# Patient Record
Sex: MALE | Race: WHITE | NOT HISPANIC OR LATINO | Employment: OTHER | URBAN - METROPOLITAN AREA
[De-identification: names, ages, dates, MRNs, and addresses within clinical notes are randomized per-mention and may not be internally consistent; named-entity substitution may affect disease eponyms.]

---

## 2020-09-24 ENCOUNTER — TELEPHONE (OUTPATIENT)
Dept: CARDIAC REHAB | Facility: HOSPITAL | Age: 65
End: 2020-09-24

## 2020-09-24 NOTE — TELEPHONE ENCOUNTER
Received referral for CHF from physician. Called to see if patient interested. Still afraid to get much with COVID virus. Not at this time. Informed we will hold onto information if want to contact us in the future.

## 2021-02-06 ENCOUNTER — INPATIENT HOSPITAL (OUTPATIENT)
Dept: URBAN - METROPOLITAN AREA HOSPITAL 107 | Facility: HOSPITAL | Age: 66
End: 2021-02-06

## 2021-02-06 DIAGNOSIS — K56.699 OTHER INTESTINAL OBSTRUCTION UNSPECIFIED AS TO PARTIAL VERSU: ICD-10-CM

## 2021-02-06 PROCEDURE — 99223 1ST HOSP IP/OBS HIGH 75: CPT | Performed by: INTERNAL MEDICINE

## 2021-02-08 ENCOUNTER — INPATIENT HOSPITAL (OUTPATIENT)
Dept: URBAN - METROPOLITAN AREA HOSPITAL 107 | Facility: HOSPITAL | Age: 66
End: 2021-02-08

## 2021-02-08 DIAGNOSIS — K56.7 ILEUS, UNSPECIFIED: ICD-10-CM

## 2021-02-08 PROCEDURE — 99232 SBSQ HOSP IP/OBS MODERATE 35: CPT | Performed by: PHYSICIAN ASSISTANT

## 2021-02-09 ENCOUNTER — INPATIENT HOSPITAL (OUTPATIENT)
Dept: URBAN - METROPOLITAN AREA HOSPITAL 107 | Facility: HOSPITAL | Age: 66
End: 2021-02-09

## 2021-02-09 DIAGNOSIS — K76.9 LIVER DISEASE, UNSPECIFIED: ICD-10-CM

## 2021-02-09 DIAGNOSIS — K56.7 ILEUS, UNSPECIFIED: ICD-10-CM

## 2021-02-09 PROCEDURE — 99232 SBSQ HOSP IP/OBS MODERATE 35: CPT | Performed by: PHYSICIAN ASSISTANT

## 2021-02-10 ENCOUNTER — INPATIENT HOSPITAL (OUTPATIENT)
Dept: URBAN - METROPOLITAN AREA HOSPITAL 107 | Facility: HOSPITAL | Age: 66
End: 2021-02-10

## 2021-02-10 DIAGNOSIS — K56.7 ILEUS, UNSPECIFIED: ICD-10-CM

## 2021-02-10 PROCEDURE — 99232 SBSQ HOSP IP/OBS MODERATE 35: CPT | Performed by: PHYSICIAN ASSISTANT

## 2021-02-11 ENCOUNTER — INPATIENT HOSPITAL (OUTPATIENT)
Dept: URBAN - METROPOLITAN AREA HOSPITAL 107 | Facility: HOSPITAL | Age: 66
End: 2021-02-11

## 2021-02-11 DIAGNOSIS — R74.8 ABNORMAL LEVELS OF OTHER SERUM ENZYMES: ICD-10-CM

## 2021-02-11 DIAGNOSIS — K56.699 OTHER INTESTINAL OBSTRUCTION UNSPECIFIED AS TO PARTIAL VERSU: ICD-10-CM

## 2021-02-11 PROCEDURE — 99232 SBSQ HOSP IP/OBS MODERATE 35: CPT | Performed by: PHYSICIAN ASSISTANT

## 2021-08-16 ENCOUNTER — TRANSCRIBE ORDERS (OUTPATIENT)
Dept: ADMINISTRATIVE | Facility: HOSPITAL | Age: 66
End: 2021-08-16

## 2021-08-16 ENCOUNTER — LAB (OUTPATIENT)
Dept: LAB | Facility: HOSPITAL | Age: 66
End: 2021-08-16

## 2021-08-16 DIAGNOSIS — E03.9 HYPOTHYROIDISM, ADULT: ICD-10-CM

## 2021-08-16 DIAGNOSIS — E03.9 HYPOTHYROIDISM, ADULT: Primary | ICD-10-CM

## 2021-08-16 LAB — TSH SERPL DL<=0.05 MIU/L-ACNC: 3.4 UIU/ML (ref 0.27–4.2)

## 2021-08-16 PROCEDURE — 84443 ASSAY THYROID STIM HORMONE: CPT

## 2021-08-16 PROCEDURE — 36415 COLL VENOUS BLD VENIPUNCTURE: CPT

## 2023-10-30 PROBLEM — H90.3 BILATERAL SENSORINEURAL HEARING LOSS: Status: ACTIVE | Noted: 2023-07-18

## 2023-10-30 PROBLEM — I42.8 NICM (NONISCHEMIC CARDIOMYOPATHY): Status: ACTIVE | Noted: 2020-12-06

## 2023-10-30 PROBLEM — I47.29 NSVT (NONSUSTAINED VENTRICULAR TACHYCARDIA): Status: ACTIVE | Noted: 2021-02-02

## 2023-10-30 PROBLEM — I48.19 PERSISTENT ATRIAL FIBRILLATION: Status: ACTIVE | Noted: 2021-02-09

## 2023-10-30 PROBLEM — E11.9 DIABETES MELLITUS WITHOUT COMPLICATION: Status: ACTIVE | Noted: 2021-08-10

## 2023-10-30 PROBLEM — E78.5 HYPERLIPIDEMIA: Status: ACTIVE | Noted: 2023-10-30

## 2023-10-30 PROBLEM — E03.9 HYPOTHYROIDISM: Status: ACTIVE | Noted: 2023-10-30

## 2023-10-30 PROBLEM — M51.26 DISPLACEMENT OF LUMBAR INTERVERTEBRAL DISC WITHOUT MYELOPATHY: Status: ACTIVE | Noted: 2023-10-30

## 2023-10-30 PROBLEM — Z95.811 LVAD (LEFT VENTRICULAR ASSIST DEVICE) PRESENT: Status: ACTIVE | Noted: 2021-01-29

## 2023-10-30 PROBLEM — I10 HTN (HYPERTENSION): Status: ACTIVE | Noted: 2022-10-06

## 2023-10-30 PROBLEM — Z46.1 ENCOUNTER FOR FITTING AND ADJUSTMENT OF HEARING AID: Status: ACTIVE | Noted: 2023-07-18

## 2023-10-30 PROBLEM — G47.30 SLEEP APNEA: Status: ACTIVE | Noted: 2023-10-30

## 2023-10-30 PROBLEM — I50.23 ACUTE ON CHRONIC SYSTOLIC (CONGESTIVE) HEART FAILURE: Status: ACTIVE | Noted: 2021-01-15

## 2023-10-30 PROBLEM — I34.0 SEVERE MITRAL REGURGITATION: Status: ACTIVE | Noted: 2020-12-07

## 2023-10-30 PROBLEM — T82.7XXA: Status: ACTIVE | Noted: 2023-08-31

## 2023-10-30 PROBLEM — I42.9 CARDIOMYOPATHY: Status: ACTIVE | Noted: 2023-10-30

## 2023-10-30 PROBLEM — E66.9 OBESITY: Status: ACTIVE | Noted: 2022-10-06

## 2023-10-30 PROBLEM — Z23 NEED FOR PROPHYLACTIC VACCINATION AND INOCULATION AGAINST INFLUENZA: Status: ACTIVE | Noted: 2023-10-30

## 2023-10-30 PROBLEM — D49.7 PITUITARY TUMOR: Status: ACTIVE | Noted: 2021-09-17

## 2024-12-06 ENCOUNTER — TRANSCRIBE ORDERS (OUTPATIENT)
Dept: PHYSICAL THERAPY | Facility: CLINIC | Age: 69
End: 2024-12-06
Payer: MEDICARE

## 2024-12-06 DIAGNOSIS — M51.360 DEGENERATION OF INTERVERTEBRAL DISC OF LUMBAR REGION WITH DISCOGENIC BACK PAIN: Primary | ICD-10-CM

## 2024-12-20 ENCOUNTER — TREATMENT (OUTPATIENT)
Dept: PHYSICAL THERAPY | Facility: CLINIC | Age: 69
End: 2024-12-20
Payer: MEDICARE

## 2024-12-20 DIAGNOSIS — M54.50 CHRONIC BILATERAL LOW BACK PAIN, UNSPECIFIED WHETHER SCIATICA PRESENT: Primary | ICD-10-CM

## 2024-12-20 DIAGNOSIS — G89.29 CHRONIC BILATERAL LOW BACK PAIN, UNSPECIFIED WHETHER SCIATICA PRESENT: Primary | ICD-10-CM

## 2024-12-20 NOTE — PROGRESS NOTES
Physical Therapy Initial Evaluation and Plan of Care    Patient: Herb Nobles   : 1955  Diagnosis/ICD-10 Code:  Chronic bilateral low back pain, unspecified whether sciatica present [M54.50, G89.29]  Referring practitioner: Osmani Adler MD  Date of Initial Visit: 2024  Today's Date: 2024  Patient seen for 1 sessions           Subjective Questionnaire: Oswestry: 68%      Subjective Evaluation    History of Present Illness  Mechanism of injury: Patient presents to physical therapy with cc of chronic lower back pain.  Patient reports history of lumbar fusion, with the last surgery being in 2018.  Patient currently using lumbar support brace when active.  Reports that he has difficulty tolerating standing and walking for more than 10 minutes at this time.  Reports pain at midline of lower back, denies radiating pain into BLE's.  Patient is an LVAD patient and is limited on aerobic activities due to lack of endurance.  Patient reports that he uses heat and takes hydrocodone for pain mangement currently.  Patient wishes to tolerate standing and walking activities with less lower back pain so that he may tolerate household chores and working in garage with better tolerance.        Patient Occupation: retired Pain  Current pain ratin  At worst pain ratin  Location: bilateral lower back  Quality: discomfort, pressure, sharp and knife-like  Relieving factors: rest, heat and medications  Aggravating factors: standing, lifting, ambulation, squatting, movement and prolonged positioning (weight bearing intolerance)  Progression: worsening    Social Support  Lives in: multiple-level home  Lives with: spouse    Patient Goals  Patient goals for therapy: decreased edema, decreased pain, increased motion, increased strength and independence with ADLs/IADLs           Objective          Palpation   Left   Hypertonic in the lumbar paraspinals and quadratus lumborum.   Tenderness of the lumbar  paraspinals and quadratus lumborum.     Right   Hypertonic in the lumbar paraspinals and quadratus lumborum. Tenderness of the lumbar paraspinals and quadratus lumborum.     Tenderness     Left Hip   Tenderness in the PSIS.     Right Hip   Tenderness in the PSIS.     Active Range of Motion     Additional Active Range of Motion Details  Flexion 50% limited with pain  Right sidebending 75% limited with pain  Left sidebending 60% limited with pain  Extension 90% limited with pain    Passive Range of Motion   Left Hip   External rotation (90/90): WFL  Internal rotation (90/90): 3 degrees with pain    Right Hip   External rotation (90/90): WFL  Internal rotation (90/90): 5 degrees with pain    Strength/Myotome Testing     Left Hip   Planes of Motion   Extension: 4-  Abduction: 4-    Right Hip   Planes of Motion   Extension: 4-  Abduction: 4-    Left Knee   Flexion: 4-  Extension: 4    Right Knee   Flexion: 4-  Extension: 4    Tests     Lumbar     Left   Positive quadrant.     Right   Positive quadrant.     Ambulation     Observational Gait   Gait: antalgic   Decreased walking speed, stride length, left step length and right step length.           Assessment & Plan       Assessment  Impairments: abnormal gait, abnormal muscle tone, abnormal or restricted ROM, activity intolerance, impaired physical strength, lacks appropriate home exercise program, pain with function and weight-bearing intolerance   Functional limitations: carrying objects, lifting, walking, pulling, pushing, uncomfortable because of pain, standing and stooping   Assessment details: Patient presents to physical therapy with s/s congruent with MD diagnosis of lower back pain.  Patient demonstrates limited AROM in lumbar spine, weakness in BLE's and abnormal gait mechanics.  Patient is appropriate for PT intervention in order to address these deficits so that he may tolerate walking and ADL's with less pain/limitation.   Prognosis: fair    Goals  Plan  Goals: In three weeks, patient will report at least 25% reduction in pain level.    In three weeks, patient will demonstrate at least 25% improvement in AROM in lumbar spine in order to demonstrate improved ability to bend down and put on pants with less limitation.     In six weeks, patient will demonstrate proper technique with abdominal stabilization activities.  In six weeks, patient will demonstrate lumbar AROM at least 50% improved without pain level over 2/10 in order to demonstrate ability to tolerate working in garage, standing, for at least 30 minutes before having to rest.  In six weeks, patient will demonstrate decreased perceived disability by decreasing score on Oswestry by at least 12%.        Plan  Therapy options: will be seen for skilled therapy services  Planned modality interventions: cryotherapy and thermotherapy (hydrocollator packs)  Planned therapy interventions: manual therapy, neuromuscular re-education, soft tissue mobilization, postural training, strengthening, stretching, therapeutic activities, home exercise program, joint mobilization, functional ROM exercises, flexibility, abdominal trunk stabilization and balance/weight-bearing training  Frequency: 2x week  Duration in weeks: 6  Treatment plan discussed with: patient        Manual Therapy:    8     mins  59884;  Therapeutic Exercise:    15     mins  16011;     Neuromuscular Kin:    15    mins  68823;    Therapeutic Activity:          mins  24770;     Gait Training:           mins  10929;     Ultrasound:          mins  31664;    Electrical Stimulation:         mins  95257 ( );  Dry Needling          mins self-pay    Timed Treatment:   38   mins   Total Treatment:     55   mins    PT SIGNATURE: Juan Jaramillo PT   DATE TREATMENT INITIATED: 12/20/2024    Initial Certification  Certification Period: 3/20/2025  I certify that the therapy services are furnished while this patient is under my care.  The services outlined above are  required by this patient, and will be reviewed every 90 days.     PHYSICIAN: Osmani Adler MD      DATE:     Please sign and return via fax to [phone].. Thank you, [facility].

## 2025-01-09 ENCOUNTER — TREATMENT (OUTPATIENT)
Dept: PHYSICAL THERAPY | Facility: CLINIC | Age: 70
End: 2025-01-09
Payer: MEDICARE

## 2025-01-09 DIAGNOSIS — M54.50 CHRONIC BILATERAL LOW BACK PAIN, UNSPECIFIED WHETHER SCIATICA PRESENT: Primary | ICD-10-CM

## 2025-01-09 DIAGNOSIS — G89.29 CHRONIC BILATERAL LOW BACK PAIN, UNSPECIFIED WHETHER SCIATICA PRESENT: Primary | ICD-10-CM

## 2025-01-09 NOTE — PROGRESS NOTES
Physical Therapy Daily Progress Note      Patient: Herb Nobles   : 1955  Diagnosis/ICD-10 Code:  Chronic bilateral low back pain, unspecified whether sciatica present [M54.50, G89.29]  Referring practitioner: Osmani Adler MD  Date of Initial Visit: Type: THERAPY  Noted: 2024  Today's Date: 2025  Patient seen for 2 sessions             Subjective No significant changes to report.  Still having the same c/o low back pain.    Objective   See Exercise, Manual, and Modality Logs for complete treatment.       Assessment/Plan  Responded well with MH.  Manual techniques tolerated fairly well.  Exercises were tolerated fairly well while being performed.  However, by the time he was finished and sat up, he stated he could tell that he will feel that (exercising) later tonight.    Progress per Plan of Care           Timed:         Manual Therapy:    8     mins  12301;     Therapeutic Exercise:    20     mins  04954;     Neuromuscular Kin:    15    mins  02068;        Timed Treatment:   43   mins   Total Treatment:     53   mins        Miguelito Hercules PTA  Physical Therapist Assistant

## 2025-04-16 ENCOUNTER — TRANSCRIBE ORDERS (OUTPATIENT)
Dept: PHYSICAL THERAPY | Facility: CLINIC | Age: 70
End: 2025-04-16
Payer: MEDICARE

## 2025-04-16 DIAGNOSIS — Z98.1 HISTORY OF LUMBAR FUSION: ICD-10-CM

## 2025-04-16 DIAGNOSIS — M51.26 DISPLACEMENT OF LUMBAR INTERVERTEBRAL DISC WITHOUT MYELOPATHY: ICD-10-CM

## 2025-04-16 DIAGNOSIS — M51.360 DEGENERATION OF INTERVERTEBRAL DISC OF LUMBAR REGION WITH DISCOGENIC BACK PAIN: Primary | ICD-10-CM

## 2025-04-30 ENCOUNTER — TREATMENT (OUTPATIENT)
Dept: PHYSICAL THERAPY | Facility: CLINIC | Age: 70
End: 2025-04-30
Payer: MEDICARE

## 2025-04-30 DIAGNOSIS — M54.50 CHRONIC MIDLINE LOW BACK PAIN, UNSPECIFIED WHETHER SCIATICA PRESENT: Primary | ICD-10-CM

## 2025-04-30 DIAGNOSIS — G89.29 CHRONIC MIDLINE LOW BACK PAIN, UNSPECIFIED WHETHER SCIATICA PRESENT: Primary | ICD-10-CM

## 2025-04-30 NOTE — PROGRESS NOTES
Physical Therapy Initial Evaluation and Plan of Care    Patient: Herb Nobles   : 1955  Diagnosis/ICD-10 Code:  Chronic midline low back pain, unspecified whether sciatica present [M54.50, G89.29]  Referring practitioner: Osmani Adler MD  Date of Initial Visit: 2025  Today's Date: 2025  Patient seen for 1 sessions           Subjective Questionnaire: Oswestry: 76%      Subjective Evaluation    History of Present Illness  Mechanism of injury: Patient presents to physical therapy with cc of chronic lower back pain.  Patient has history of L3-L4 posterior fusion in 2018.  Patient was seen for PT intervention in  for lower back and had some improvement, however over the past few months his lower back pain has gotten worse.  Reports that he is having difficulty tolerating prolonged standing and walking due to the pain in his lower back.  Patient is retired, however wishes to be active with yard work/housework with less pain in lower back.     Pain  Current pain ratin  At worst pain ratin  Location: bilateral posterior hips, lower back  Quality: sharp, knife-like and discomfort  Relieving factors: support, rest and heat  Aggravating factors: stairs, standing, ambulation, squatting, lifting, movement and prolonged positioning  Progression: worsening    Social Support  Lives in: multiple-level home    Diagnostic Tests  MRI studies: abnormal    Patient Goals  Patient goals for therapy: decreased pain, increased strength, independence with ADLs/IADLs and increased motion           Objective          Palpation   Left   Hypertonic in the lumbar paraspinals.   Tenderness of the lumbar paraspinals.     Right   Hypertonic in the lumbar paraspinals. Tenderness of the lumbar paraspinals.     Tenderness     Left Hip   Tenderness in the PSIS.     Right Hip   Tenderness in the PSIS.     Active Range of Motion     Additional Active Range of Motion Details  Flexion 25% limited   R sidebending 50%  limited with lower back pain  Extension 75% limited with lower back pain  L sidebending 25% limited     Passive Range of Motion   Left Hip   External rotation (90/90): 50 degrees   Internal rotation (90/90): 10 degrees     Right Hip   External rotation (90/90): 45 degrees   Internal rotation (90/90): 0 degrees with pain    Strength/Myotome Testing     Left Hip   Planes of Motion   Flexion: 4+  Extension: 4-  Abduction: 4-  External rotation: 5  Internal rotation: 5    Right Hip   Planes of Motion   Flexion: 4+  Extension: 4-  Abduction: 4-  External rotation: 5  Internal rotation: 5    Left Knee   Flexion: 5  Extension: 5    Right Knee   Flexion: 3+  Extension: 5    Left Ankle/Foot   Dorsiflexion: 5    Right Ankle/Foot   Dorsiflexion: 5    Tests     Lumbar     Right   Positive quadrant.     Right Hip   Positive FADIR.     Ambulation     Observational Gait   Gait: antalgic   Decreased walking speed and stride length.           Assessment & Plan       Assessment  Impairments: abnormal gait, abnormal or restricted ROM, activity intolerance, impaired physical strength, lacks appropriate home exercise program and pain with function   Functional limitations: carrying objects, lifting, walking, pulling, pushing, uncomfortable because of pain and standing   Assessment details: Patient presents to physical therapy with s/s congruent with MD diagnosis of lower back pain.  Patient demonstrates AROM limitation in all planes, weakness in BLE's and elevated pain level with functional movements.  Patient is appropriate for PT intervention in order to address these activities so that he may tolerate ADL's and walking with less pain/limitation.   Prognosis: good    Goals  Plan Goals: In two weeks, patient will report at least 25% reduction in pain level.    In two weeks, patient will demonstrate at least 25% improvement in AROM in lumbar spine in order to demonstrate decreased limitation when getting in/out of vehicle.     In four  weeks, patient will demonstrate proper technique with HEP.  In four weeks, patient will demonstrate lumbar AROM WFL without pain level over 2/10 in order to demonstrate ability to bend down to put on socks/shoes with less limitation.  In four weeks, patient will demonstrate decreased perceived disability by decreasing score on Oswestry by at least 12%.       Plan  Therapy options: will be seen for skilled therapy services  Planned modality interventions: cryotherapy and thermotherapy (hydrocollator packs)  Planned therapy interventions: manual therapy, neuromuscular re-education, soft tissue mobilization, strengthening, joint mobilization, stretching, therapeutic activities, home exercise program, functional ROM exercises, flexibility and abdominal trunk stabilization  Frequency: 2x week  Duration in weeks: 6  Treatment plan discussed with: patient        Manual Therapy:   10     mins  75098;  Therapeutic Exercise:    15     mins  44260;     Neuromuscular Kin:        mins  61520;    Therapeutic Activity:          mins  75846;     Gait Training:           mins  21313;     Ultrasound:          mins  08484;    Electrical Stimulation:         mins  23650 (MC );  Dry Needling          mins self-pay    Timed Treatment:   25   mins   Total Treatment:     55   mins    PT SIGNATURE: Juan Jaramillo PT   DATE TREATMENT INITIATED: 4/30/2025    Initial Certification  Certification Period: 7/29/2025  I certify that the therapy services are furnished while this patient is under my care.  The services outlined above are required by this patient, and will be reviewed every 90 days.     PHYSICIAN: Osmani Adler MD      DATE:     Please sign and return via fax to [phone].. Thank you, [facility].

## 2025-05-02 ENCOUNTER — TREATMENT (OUTPATIENT)
Dept: PHYSICAL THERAPY | Facility: CLINIC | Age: 70
End: 2025-05-02
Payer: MEDICARE

## 2025-05-02 DIAGNOSIS — G89.29 CHRONIC MIDLINE LOW BACK PAIN, UNSPECIFIED WHETHER SCIATICA PRESENT: Primary | ICD-10-CM

## 2025-05-02 DIAGNOSIS — M54.50 CHRONIC MIDLINE LOW BACK PAIN, UNSPECIFIED WHETHER SCIATICA PRESENT: Primary | ICD-10-CM

## 2025-05-02 NOTE — PROGRESS NOTES
Physical Therapy Daily Progress Note      Patient: Herb Nobles   : 1955  Diagnosis/ICD-10 Code:  Chronic midline low back pain, unspecified whether sciatica present [M54.50, G89.29]  Referring practitioner: Osmani Adler MD  Date of Initial Visit: Type: THERAPY  Noted: 2025  Today's Date: 2025  Patient seen for 2 sessions         Herb Nobles reports: Lower back still sore today.  Reports compliance with HEP.    Objective   See Exercise, Manual, and Modality Logs for complete treatment.       Assessment/Plan    Feeling well after manual therapy techniques, however does report some soreness in legs after exercise progression.  Feeling well at end of visit.    Progress per Plan of Care           Manual Therapy:    12     mins  97952;  Therapeutic Exercise:    15     mins  81125;     Neuromuscular Kin:    15    mins  38878;    Therapeutic Activity:          mins  04096;     Gait Training:           mins  52640;     Ultrasound:          mins  65616;    Electrical Stimulation:         mins  94738 ( );  Dry Needling          mins self-pay    Timed Treatment:   42   mins   Total Treatment:     42   mins    Juan Jaramillo PT  Physical Therapist